# Patient Record
Sex: FEMALE | Race: BLACK OR AFRICAN AMERICAN
[De-identification: names, ages, dates, MRNs, and addresses within clinical notes are randomized per-mention and may not be internally consistent; named-entity substitution may affect disease eponyms.]

---

## 2020-11-07 ENCOUNTER — HOSPITAL ENCOUNTER (EMERGENCY)
Dept: HOSPITAL 52 - LL.ED | Age: 50
Discharge: NURSING, REHAB OR LONG-TERM CARE | End: 2020-11-07
Payer: COMMERCIAL

## 2020-11-07 DIAGNOSIS — M79.652: ICD-10-CM

## 2020-11-07 DIAGNOSIS — M54.5: Primary | ICD-10-CM

## 2020-11-07 DIAGNOSIS — M79.89: ICD-10-CM

## 2020-11-07 DIAGNOSIS — Z90.721: ICD-10-CM

## 2020-11-07 DIAGNOSIS — Z88.8: ICD-10-CM

## 2020-11-07 DIAGNOSIS — R79.1: ICD-10-CM

## 2020-11-07 LAB
CHLORIDE SERPL-SCNC: 103 MMOL/L (ref 98–107)
SODIUM SERPL-SCNC: 137 MMOL/L (ref 136–145)

## 2020-11-07 PROCEDURE — 96374 THER/PROPH/DIAG INJ IV PUSH: CPT

## 2020-11-07 PROCEDURE — 80053 COMPREHEN METABOLIC PANEL: CPT

## 2020-11-07 PROCEDURE — 83605 ASSAY OF LACTIC ACID: CPT

## 2020-11-07 PROCEDURE — 83735 ASSAY OF MAGNESIUM: CPT

## 2020-11-07 PROCEDURE — 84703 CHORIONIC GONADOTROPIN ASSAY: CPT

## 2020-11-07 PROCEDURE — 85025 COMPLETE CBC W/AUTO DIFF WBC: CPT

## 2020-11-07 PROCEDURE — 85379 FIBRIN DEGRADATION QUANT: CPT

## 2020-11-07 PROCEDURE — 99284 EMERGENCY DEPT VISIT MOD MDM: CPT

## 2020-11-07 PROCEDURE — 36415 COLL VENOUS BLD VENIPUNCTURE: CPT

## 2020-11-07 PROCEDURE — 74177 CT ABD & PELVIS W/CONTRAST: CPT

## 2020-11-07 NOTE — EDM.PDOC
ED HPI GENERAL MEDICAL PROBLEM





- General


Chief Complaint: General


Stated Complaint: left groin to back pain


Time Seen by Provider: 11/07/20 12:35


Source of Information: Reports: Patient


History Limitations: Reports: No Limitations





- History of Present Illness


INITIAL COMMENTS - FREE TEXT/NARRATIVE: 





Patient comes to ER with complaint of left sided pain in anterior thigh/low back

.  Started Wednesday, 3 days ago, gradually worsening.  


Notes mild swelling left anterior upper thigh area. 





Gives history of having surgery in September where left ovary removed/found to 

be "infected".  Was not placed on antibiotics per self report. She had been 

having left lower abdominal discomfort prior to the surgery and it was thought 

that she might have ovarian cysts involved.  No cysts found per patient but 

ovaries/fallopian tubes were "up in intestines". 





Noted swelling in same upper anterior/lateral thigh area early October but did 

not have pain like today.  Says that she was seen in [place] [facility] for this and 

nothing of note was found/told to follow up with primary provider.  It was 

mentioned that they might order an MRI but this was not done due to patient 

moving to [place].  





Hard to ambulate.  Pain radiates up to left lower back when standing. 





Denies any specific new injuries. 


No fevers/chills/symptoms of infection.


Eating/drinking well.  No issues with urination/bowel movements. 


No swelling of lower legs noted. 


No neuro changes/weakness/numbness.  


HEENT/Resp/CV/chest ROS negative. 





Did not complain of abdominal pain.  


  ** left groin/hip that wraps around to back


Pain Score (Numeric/FACES): 10





- Related Data


                                    Allergies











Allergy/AdvReac Type Severity Reaction Status Date / Time


 


aspirin Allergy  Chest Pain Verified 11/07/20 12:09











Home Meds: 


                                    Home Meds





Ibuprofen 800 mg PO TID 11/07/20 [History]











Past Medical History


OB/GYN History: Reports: Other (See Below) (Hx of removal of "infected" left 

ovary Sept 2020)





Social & Family History





- Tobacco Use


Tobacco Use Status *Q: Never Tobacco User


Second Hand Smoke Exposure: No





- Caffeine Use


Caffeine Use: Reports: None





- Alcohol Use


Alcohol Use History: Yes


Alcohol Use Frequency: Rarely





- Recreational Drug Use


Recreational Drug Use: No





ED ROS GENERAL





- Review of Systems


Review Of Systems: Comprehensive ROS is negative, except as noted in HPI.





ED EXAM, GENERAL





- Physical Exam


Exam: See Below


Exam Limited By: No Limitations


General Appearance: Alert, WD/WN, No Apparent Distress


Eye Exam: Bilateral Eye: EOMI, PERRL


Ears: Hearing Grossly Normal


Nose: No: Nasal Deformity, Nasal Swelling, Nasal Drainage


Throat/Mouth: Normal Lips, Normal Voice, No Airway Compromise


Head: Atraumatic, Normocephalic


Neck: Supple


Respiratory/Chest: No Respiratory Distress, Lungs Clear, Normal Breath Sounds, 

No Accessory Muscle Use, Chest Non-Tender


Cardiovascular: Regular Rate, Rhythm, No Murmur


GI/Abdominal: Normal Bowel Sounds, Soft, No Distention, Other (mild discomfort 

left lower quadrant with deep palpation)


 (Female) Exam: Deferred


Rectal (Female) Exam: Deferred


Back Exam: Other (left sided tenderness with palpation/percussion lower 1/2 

back).  No: Vertebral Tenderness


Extremities: Normal Range of Motion, No Pedal Edema, Normal Capillary Refill, 

Other (Faint appearance of mild swelling left upper anterior/lateral thigh.  No 

specific tenderness elicited with palpation. Unable to exacerbate pain complaint

 with palpation of groin area. Pain in left low back elicited with straight leg 

raise once leg past 50 degrees. No appearance of edema involving lower left leg.

  No redness or increased warmth noted in affected area. ).  No: Increased 

Warmth


Neurological: Alert, Oriented, Abnormal Gait (favors left leg with ambulation), 

Other (equal strength bilaterally)


Psychiatric: Normal Affect, Normal Mood


Skin Exam: Warm, Dry, Intact, Normal Color





Course





- Vital Signs


Last Recorded V/S: 


                                Last Vital Signs











Temp  35.9 C L  11/07/20 12:12


 


Pulse  61   11/07/20 12:12


 


Resp  16   11/07/20 12:12


 


BP  140/80   11/07/20 12:12


 


Pulse Ox  100   11/07/20 12:12














- Orders/Labs/Meds


Orders: 


                               Active Orders 24 hr











 Category Date Time Status


 


 Abdomen Pelvis w Cont [CT] Stat Exams  11/07/20 13:09 Taken


 


 UA W/MICROSCOPIC [URIN] Stat Lab  11/07/20 12:25 Ordered


 


 Sodium Chloride 0.9% [Saline Flush] Med  11/07/20 12:26 Active





 10 ml FLUSH ASDIRECTED PRN   


 


 Saline Lock Insert [OM.PC] Routine Oth  11/07/20 12:26 Ordered








                                Medication Orders





Sodium Chloride (Saline Flush)  10 ml FLUSH ASDIRECTED PRN


   PRN Reason: Keep Vein Open


   Last Admin: 11/07/20 13:18  Dose: 10 ml


   Documented by: CAN








Labs: 


                                Laboratory Tests











  11/07/20 11/07/20 11/07/20 Range/Units





  12:30 12:30 12:30 


 


WBC  6.1    (4.0-10.2)  K/uL


 


RBC  4.85    (3.77-5.09)  M/uL


 


Hgb  12.8    (11.7-15.5)  g/dL


 


Hct  39.2    (34.0-46.0)  %


 


MCV  80.8 L    (84.0-98.0)  fL


 


MCH  26.4 L    (28.2-33.3)  pg


 


MCHC  32.7    (31.7-36.0)  g/dL


 


RDW  13.6    (11.2-14.1)  %


 


Plt Count  209    (150-350)  K/uL


 


Neut % (Auto)  82.7 H    (45.0-80.0)  %


 


Lymph % (Auto)  12.3    (10.0-50.0)  %


 


Mono % (Auto)  4.8    (2.0-14.0)  %


 


Eos % (Auto)  0.2    (0.0-5.0)  %


 


Baso % (Auto)  0.0    (0.0-2.0)  %


 


Neut # (Auto)  5.05    (1.40-7.00)  K/uL


 


Lymph # (Auto)  0.75    (0.50-3.50)  K/uL


 


Mono # (Auto)  0.29    (0.00-1.00)  K/uL


 


Eos # (Auto)  0.01    (0.00-0.50)  K/uL


 


Baso # (Auto)  0.00    (0.00-0.20)  K/uL


 


D-Dimer, Quantitative   632 H   (0-400)  ng/mL


 


Sodium    137  (136-145)  mmol/L


 


Potassium    3.7  (3.5-5.1)  mmol/L


 


Chloride    103  ()  mmol/L


 


Carbon Dioxide    27.1  (21.0-32.0)  mmol/L


 


BUN    6 L  (7-18)  mg/dL


 


Creatinine    0.59  (0.51-1.17)  mg/dL


 


Est Cr Clr Drug Dosing    96.46  mL/min


 


Estimated GFR (MDRD)    > 60  mL/min


 


Glucose    118 H  ()  mg/dL


 


Lactic Acid     (0.4-2.0)  mmol/L


 


Calcium    9.8  (8.5-10.1)  mg/dL


 


Magnesium    2.0  (1.8-2.4)  mg/dL


 


Total Bilirubin    0.4  (0.2-1.0)  mg/dL


 


AST    12 L  (15-37)  U/L


 


ALT    19  (12-78)  U/L


 


Alkaline Phosphatase    73  ()  IU/L


 


Total Protein    7.7  (6.4-8.2)  g/dL


 


Albumin    3.7  (3.4-5.0)  g/dL


 


HCG, Qual     (NEGATIVE)  














  11/07/20 11/07/20 Range/Units





  12:30 12:30 


 


WBC    (4.0-10.2)  K/uL


 


RBC    (3.77-5.09)  M/uL


 


Hgb    (11.7-15.5)  g/dL


 


Hct    (34.0-46.0)  %


 


MCV    (84.0-98.0)  fL


 


MCH    (28.2-33.3)  pg


 


MCHC    (31.7-36.0)  g/dL


 


RDW    (11.2-14.1)  %


 


Plt Count    (150-350)  K/uL


 


Neut % (Auto)    (45.0-80.0)  %


 


Lymph % (Auto)    (10.0-50.0)  %


 


Mono % (Auto)    (2.0-14.0)  %


 


Eos % (Auto)    (0.0-5.0)  %


 


Baso % (Auto)    (0.0-2.0)  %


 


Neut # (Auto)    (1.40-7.00)  K/uL


 


Lymph # (Auto)    (0.50-3.50)  K/uL


 


Mono # (Auto)    (0.00-1.00)  K/uL


 


Eos # (Auto)    (0.00-0.50)  K/uL


 


Baso # (Auto)    (0.00-0.20)  K/uL


 


D-Dimer, Quantitative    (0-400)  ng/mL


 


Sodium    (136-145)  mmol/L


 


Potassium    (3.5-5.1)  mmol/L


 


Chloride    ()  mmol/L


 


Carbon Dioxide    (21.0-32.0)  mmol/L


 


BUN    (7-18)  mg/dL


 


Creatinine    (0.51-1.17)  mg/dL


 


Est Cr Clr Drug Dosing    mL/min


 


Estimated GFR (MDRD)    mL/min


 


Glucose    ()  mg/dL


 


Lactic Acid  1.1   (0.4-2.0)  mmol/L


 


Calcium    (8.5-10.1)  mg/dL


 


Magnesium    (1.8-2.4)  mg/dL


 


Total Bilirubin    (0.2-1.0)  mg/dL


 


AST    (15-37)  U/L


 


ALT    (12-78)  U/L


 


Alkaline Phosphatase    ()  IU/L


 


Total Protein    (6.4-8.2)  g/dL


 


Albumin    (3.4-5.0)  g/dL


 


HCG, Qual   Negative  (NEGATIVE)  











Meds: 


Medications











Generic Name Dose Route Start Last Admin





  Trade Name Freq  PRN Reason Stop Dose Admin


 


Sodium Chloride  10 ml  11/07/20 12:26  11/07/20 13:18





  Saline Flush  FLUSH   10 ml





  ASDIRECTED PRN   Administration





  Keep Vein Open  














Discontinued Medications














Generic Name Dose Route Start Last Admin





  Trade Name Freq  PRN Reason Stop Dose Admin


 


Iopamidol  100 ml  11/07/20 13:25  11/07/20 13:55





  Isovue-300 (61%)  IVPUSH  11/07/20 13:26  100 ml





  ONETIME ONE   Administration


 


Ketorolac Tromethamine  30 mg  11/07/20 13:12  11/07/20 13:17





  Toradol  IVPUSH  11/07/20 13:13  30 mg





  ONETIME ONE   Administration














- Re-Assessments/Exams


Free Text/Narrative Re-Assessment/Exam: 





11/07/20 13:32


Baseline labs performed.  WBC normal.  DDimer elevated.


Differential included abscess/changes related to recent left ovarian 

surgery/abdominal concerns. Also potential of thromboembolism given groin 

discomfort/swelling.  However it is noted that the observed minor swelling of 

the left upper thigh is isolated and there is no circumferential change in left 

lower leg which would be expected with DVT.  Cannot rule out disc 

disease/radiated pain from spinal etiology.





Discussed differential with [facility].  They recommended abd/pelvic CT 

to rule out intra-abdominal pathology/abscess.  They also recommended US study 

of leg to rule out DVT/focal infection.  That cannot be performed here today as 

it is the weekend.  Patient at this time is not wanting to travel to [facility] in 

[place] for immediate US study today if abdominal CT is unremarkable.  She prefers

 to wait until Monday when US is available here.  Risk of waiting discussed with

 patient including PE from DVT if clot actually is found to be present.  At this

 time she is willing to take that risk.





If both of the above ultimately end up to be negative for focal findings would 

anticipate the need for patient to follow up with a primary provider locally and

 be scheduled for MRI study to assess for nerve impingement/disc pathology.





CT of abdomen/pelvis ordered.  IV Toradol administered. 


Free Text/Narrative Re-Assessment/Exam: 





11/07/20 15:32


Radiology read CT of abdomen and pelvis as negative. 


Patient is now agreeable with having US of upper left leg performed today.  Call

 placed to [facility] in [place].  Patient accepted for transfer to their ER by 

.  US can be performed at their facility today. Further evaluation and 

treatment per [facility] after they have had a change to perform US study and 

evaluate patient. 





Departure





- Departure


Time of Disposition: 15:34


Disposition: DC/Tfer to Acute Hospital [facility]


Condition: Good


Clinical Impression: 


 Pain of back and left lower extremity, Localized swelling of lower extremity, 

Elevated d-dimer








- Discharge Information


*PRESCRIPTION DRUG MONITORING PROGRAM REVIEWED*: Not Applicable


*COPY OF PRESCRIPTION DRUG MONITORING REPORT IN PATIENT GUY: Not Applicable


Forms:  ED Department Discharge


Additional Instructions: 


Drive directly to [facility] in [place]. They are expecting you.  They will be 

able to perform the US study.  You may need to be set up for additional 

imaging/follow up if ultrasound study is found to be normal.  Follow up as 

recommended by [facility] once they have had a chance to evaluate you. 





Sepsis Event Note (ED)





- Evaluation


Sepsis Screening Result: No Definite Risk





- Focused Exam


Vital Signs: 


                                   Vital Signs











  Temp Pulse Resp BP Pulse Ox


 


 11/07/20 12:12  35.9 C L  61  16  140/80  100














- My Orders


Last 24 Hours: 


My Active Orders





11/07/20 12:25


UA W/MICROSCOPIC [URIN] Stat 





11/07/20 12:26


Sodium Chloride 0.9% [Saline Flush]   10 ml FLUSH ASDIRECTED PRN 


Saline Lock Insert [OM.PC] Routine 





11/07/20 13:09


Abdomen Pelvis w Cont [CT] Stat 














- Assessment/Plan


Last 24 Hours: 


My Active Orders





11/07/20 12:25


UA W/MICROSCOPIC [URIN] Stat 





11/07/20 12:26


Sodium Chloride 0.9% [Saline Flush]   10 ml FLUSH ASDIRECTED PRN 


Saline Lock Insert [OM.PC] Routine 





11/07/20 13:09


Abdomen Pelvis w Cont [CT] Stat

## 2022-06-30 ENCOUNTER — HOSPITAL ENCOUNTER (EMERGENCY)
Dept: HOSPITAL 52 - LL.ED | Age: 52
Discharge: HOME | End: 2022-06-30
Payer: COMMERCIAL

## 2022-06-30 DIAGNOSIS — Z88.8: ICD-10-CM

## 2022-06-30 DIAGNOSIS — B34.9: Primary | ICD-10-CM

## 2022-06-30 DIAGNOSIS — Z20.822: ICD-10-CM

## 2022-06-30 LAB
RSV RNA UPPER RESP QL NAA+PROBE: NEGATIVE
SARS-COV-2 RNA RESP QL NAA+PROBE: NEGATIVE

## 2022-06-30 PROCEDURE — 99283 EMERGENCY DEPT VISIT LOW MDM: CPT

## 2022-06-30 PROCEDURE — 0241U: CPT

## 2022-06-30 RX ADMIN — ONDANSETRON ONE MG: 4 TABLET, ORALLY DISINTEGRATING ORAL at 07:37
